# Patient Record
Sex: MALE | Race: WHITE | NOT HISPANIC OR LATINO | Employment: UNEMPLOYED | URBAN - METROPOLITAN AREA
[De-identification: names, ages, dates, MRNs, and addresses within clinical notes are randomized per-mention and may not be internally consistent; named-entity substitution may affect disease eponyms.]

---

## 2022-10-26 ENCOUNTER — TELEPHONE (OUTPATIENT)
Dept: NEUROLOGY | Facility: CLINIC | Age: 47
End: 2022-10-26

## 2022-10-26 NOTE — TELEPHONE ENCOUNTER
----- Message from Raciel Carranza sent at 10/5/2022  8:13 AM CDT -----  Good morning,    Please contact the patient if [facility] epilepsy clinic is available. If not, please let me know so I can reach out to the referring office.    Thank you,    [facility]  ----- Message -----  From: Pratima Alvarez LPN  Sent: 10/4/2022   3:05 PM CDT  To: Raciel Miramontes does not see patients in clinic, only in hospital. Dr. Coleman does not have any available appointments for the rest of the year.     Thank you,  Pratima Alvarez LPN  ----- Message -----  From: Raciel Carranza  Sent: 10/4/2022   2:53 PM CDT  To: Jared Quintero    Good afternoon,    The pt listed above is being referred from Alvaro Roberto to Dr. Live Miramontes for (complex partial epileptic seizure). I have scanned the referral and records into [employer]. I was not able to find a clinic schedule for Dr. Miramontes. Please contact the patient if Dr. Coleman is able to see him. If not, please let me know and I will contact the referring office.    Thank You,    [facility]

## 2022-11-03 ENCOUNTER — TELEPHONE (OUTPATIENT)
Dept: NEUROLOGY | Facility: CLINIC | Age: 47
End: 2022-11-03
Payer: MEDICARE

## 2022-11-03 NOTE — TELEPHONE ENCOUNTER
----- Message from Joann Vann sent at 11/3/2022  2:14 PM CDT -----  Contact: [phone]  Type:  Patient Returning Call    Who Called:  Jonel Santos Mother   Who Left Message for Patient:  Francisca   Does the patient know what this is regarding?: yes     Best Call Back Number:   [phone]

## 2022-11-03 NOTE — TELEPHONE ENCOUNTER
Returned call to discuss scheduling, left message instructing to pt to call back to discuss scheduling an appt.

## 2022-11-03 NOTE — TELEPHONE ENCOUNTER
----- Message from Allison Quiros sent at 11/2/2022 10:02 AM CDT -----  Regarding: Referral on file 10/4 in media, please contact to schedule  Referral on file 10/4 in media, please contact to schedule  Patient wants to be scheduled in [place]  He was placed on wait list and advised to check often for available appts  No dates coming up for me to schedule    Please contact  to schedule at your earliest opportunity @# [phone]    PT

## 2022-11-03 NOTE — TELEPHONE ENCOUNTER
Spoke with the pts mother, reports the pt is scheduled to have an EEG at [facility] . I advised the mother that the pt will need to have the EEG performed at [facility] instead so the results will be available at the time of visit. Mother v/u and agreed to cancel EEG. I contacted the PCP to request order for EEG be sent to our office so I can schedule the pt, office was closed, I will call back tomorrow to get the EEG.

## 2022-11-08 NOTE — TELEPHONE ENCOUNTER
MANAS at Dr. Roberto's office requesting the order for the EEG be faxed to our office, fax number provided.

## 2022-11-09 NOTE — TELEPHONE ENCOUNTER
Spoke with Dr Roberto's office, reports the EEG order is being faxed now. Will schedule with [facility] once received.

## 2022-11-09 NOTE — TELEPHONE ENCOUNTER
Order for EEG faxed to [email or web address] . They plan to call the pts mother to schedule. Order sent to scanning.

## 2022-11-28 ENCOUNTER — TELEPHONE (OUTPATIENT)
Dept: NEUROLOGY | Facility: CLINIC | Age: 47
End: 2022-11-28
Payer: MEDICARE

## 2022-11-28 NOTE — TELEPHONE ENCOUNTER
Spoke to the pt, appt scheduled from wait list 12/1/22. Pt to get CT of head from [facility] and bring to the visit. Cd and report.  EEG done on 11/30.

## 2022-12-02 ENCOUNTER — OFFICE VISIT (OUTPATIENT)
Dept: NEUROLOGY | Facility: CLINIC | Age: 47
End: 2022-12-02
Payer: MEDICARE

## 2022-12-02 VITALS
HEART RATE: 74 BPM | DIASTOLIC BLOOD PRESSURE: 85 MMHG | WEIGHT: 315 LBS | SYSTOLIC BLOOD PRESSURE: 127 MMHG | RESPIRATION RATE: 18 BRPM

## 2022-12-02 DIAGNOSIS — S06.0X0A CONCUSSION WITHOUT LOSS OF CONSCIOUSNESS, INITIAL ENCOUNTER: Primary | ICD-10-CM

## 2022-12-02 PROCEDURE — 99213 OFFICE O/P EST LOW 20 MIN: CPT | Mod: PBBFAC,PO | Performed by: PSYCHIATRY & NEUROLOGY

## 2022-12-02 PROCEDURE — 99203 OFFICE O/P NEW LOW 30 MIN: CPT | Mod: S$PBB,,, | Performed by: PSYCHIATRY & NEUROLOGY

## 2022-12-02 PROCEDURE — 99999 PR PBB SHADOW E&M-EST. PATIENT-LVL III: ICD-10-PCS | Mod: PBBFAC,,, | Performed by: PSYCHIATRY & NEUROLOGY

## 2022-12-02 PROCEDURE — 99999 PR PBB SHADOW E&M-EST. PATIENT-LVL III: CPT | Mod: PBBFAC,,, | Performed by: PSYCHIATRY & NEUROLOGY

## 2022-12-02 PROCEDURE — 99203 PR OFFICE/OUTPT VISIT, NEW, LEVL III, 30-44 MIN: ICD-10-PCS | Mod: S$PBB,,, | Performed by: PSYCHIATRY & NEUROLOGY

## 2022-12-02 RX ORDER — OLMESARTAN MEDOXOMIL 40 MG/1
40 TABLET ORAL
COMMUNITY
Start: 2022-10-04

## 2022-12-02 RX ORDER — ALBUTEROL SULFATE 90 UG/1
AEROSOL, METERED RESPIRATORY (INHALATION)
COMMUNITY
Start: 2022-08-08

## 2022-12-02 RX ORDER — ATORVASTATIN CALCIUM 40 MG/1
40 TABLET, FILM COATED ORAL
COMMUNITY
Start: 2022-10-04

## 2022-12-02 RX ORDER — AMLODIPINE BESYLATE 5 MG/1
5 TABLET ORAL
COMMUNITY
Start: 2022-10-04

## 2022-12-02 RX ORDER — AMOXICILLIN 500 MG
CAPSULE ORAL DAILY
COMMUNITY

## 2022-12-02 RX ORDER — NIACIN 500 MG/1
TABLET, EXTENDED RELEASE ORAL
COMMUNITY

## 2022-12-02 RX ORDER — TRAZODONE HYDROCHLORIDE 100 MG/1
100 TABLET ORAL NIGHTLY
COMMUNITY
Start: 2022-10-04

## 2022-12-02 RX ORDER — DULOXETIN HYDROCHLORIDE 60 MG/1
60 CAPSULE, DELAYED RELEASE ORAL
COMMUNITY
Start: 2022-10-04

## 2022-12-02 RX ORDER — TRIAMTERENE/HYDROCHLOROTHIAZID 37.5-25 MG
TABLET ORAL
COMMUNITY

## 2022-12-02 RX ORDER — ICOSAPENT ETHYL 1000 MG/1
2 CAPSULE ORAL 2 TIMES DAILY
COMMUNITY
Start: 2022-10-04

## 2022-12-02 NOTE — PROGRESS NOTES
Date of service:  12/02/2022     Chief complaint:  Question of seizure    History of present illness:  The patient is a 47 y.o. male with developmental delay we have been asked to see for evaluation of an episode for which concern was voiced for seizure. This occurred about 2 months ago. There was no clear warning.  The patient fell and may have struck his head.  He seemed a little confused, and he may have had some diffuse twitching.  He did not lose consciousness.  Afterwards, he remained confused for about 5 days.  He is now back to his baseline.  He has no prior history of seizures.      Past Medical History:   Diagnosis Date    Headache     Mental disorder     Mild intermittent asthma, uncomplicated     Obesity        Past Surgical History:   Procedure Laterality Date    CHOLECYSTECTOMY      TONSILLECTOMY, ADENOIDECTOMY         Family History   Problem Relation Age of Onset    Asthma Mother     Heart disease Maternal Grandmother     Seizures Maternal Grandfather     Cancer Maternal Grandfather        Social History     Socioeconomic History    Marital status: Single   Tobacco Use    Smoking status: Every Day     Types: Cigars    Smokeless tobacco: Never   Substance and Sexual Activity    Alcohol use: Yes    Drug use: Never    Sexual activity: Not Currently        Review of patient's allergies indicates:   Allergen Reactions    Demerol [meperidine]     Dilaudid [hydromorphone]         Review of Systems  The patient's ROS is noncontributory except as noted above.    Physical exam:  /85 (BP Location: Left arm, Patient Position: Sitting, BP Method: Large (Automatic))   Pulse 74   Resp 18   Wt (!) 144.8 kg (319 lb 5.4 oz)   General: Well developed, obese.  No acute distress.  ENT: Mucus membranes moist.  Atraumatic external nose and ears.  Lymphatic: No apparent lymphadenopathy.  Cardiovascular: Regular rate and rhythm.  Pulmonary: No increased work of breathing.  Abdomen/GI: No guarding.  Musculoskeletal:  No clubbing or cyanosis.    Neurological exam:  Mental status: Awake and alert.  Speech sparse.  Recent and remote memory appear to be limited.  Fund of knowledge limited.  Cranial nerves: Pupils equal round and reactive to light, extraocular movements intact, facial strength and sensation intact bilaterally, palate and tongue midline, hearing grossly intact bilaterally.  Motor: 5 out of 5 strength throughout the upper and lower extremities bilaterally. Normal bulk and tone.  Sensation: Intact to light touch and temperature bilaterally.  DTR: 2+ at the knees and biceps bilaterally.  Coordination: Finger-nose-finger testing intact bilaterally.  Gait: Nonfocal gait.    Data base:  Notes from the referring physician were reviewed.  Briefly summarized, these indicate question of seizure.    EEG:  Normal    Assessment and plan:  The patient is a 47 y.o. male we have been asked to see for evaluation for event questionable for seizure. In listening to the history, my index of suspicion that the event is a seizure is low.  Further, the patient's EEG was not worrisome. I think that the patient may have suffered a mild concussion from his fall which produced his confusion.  We will see the patient back on a prn basis.

## 2024-03-23 LAB — POCT GLUCOSE: 149 MG/DL (ref 70–110)

## 2024-03-23 PROCEDURE — 99285 EMERGENCY DEPT VISIT HI MDM: CPT

## 2024-03-23 PROCEDURE — 82962 GLUCOSE BLOOD TEST: CPT

## 2024-03-24 ENCOUNTER — HOSPITAL ENCOUNTER (EMERGENCY)
Facility: HOSPITAL | Age: 49
Discharge: HOME | End: 2024-03-24
Attending: EMERGENCY MEDICINE
Payer: MEDICARE

## 2024-03-24 VITALS
DIASTOLIC BLOOD PRESSURE: 61 MMHG | HEART RATE: 80 BPM | WEIGHT: 315 LBS | HEIGHT: 70 IN | RESPIRATION RATE: 17 BRPM | TEMPERATURE: 97 F | BODY MASS INDEX: 45.1 KG/M2 | OXYGEN SATURATION: 95 % | SYSTOLIC BLOOD PRESSURE: 141 MMHG

## 2024-03-24 DIAGNOSIS — R10.9 ABDOMINAL PAIN, UNSPECIFIED ABDOMINAL LOCATION: Primary | ICD-10-CM

## 2024-03-24 DIAGNOSIS — R93.5 ABNORMAL CT OF THE ABDOMEN: ICD-10-CM

## 2024-03-24 LAB
ALBUMIN SERPL BCP-MCNC: 4.1 G/DL (ref 3.5–5.2)
ALP SERPL-CCNC: 78 U/L (ref 55–135)
ALT SERPL W/O P-5'-P-CCNC: 81 U/L (ref 10–44)
ANION GAP SERPL CALC-SCNC: 14 MMOL/L (ref 8–16)
AST SERPL-CCNC: 39 U/L (ref 10–40)
BACTERIA #/AREA URNS HPF: NORMAL /HPF
BASOPHILS # BLD AUTO: 0.04 K/UL (ref 0–0.2)
BASOPHILS NFR BLD: 0.4 % (ref 0–1.9)
BILIRUB SERPL-MCNC: 0.8 MG/DL (ref 0.1–1)
BILIRUB UR QL STRIP: NEGATIVE
BUN SERPL-MCNC: 17 MG/DL (ref 6–20)
CALCIUM SERPL-MCNC: 10.3 MG/DL (ref 8.7–10.5)
CHLORIDE SERPL-SCNC: 98 MMOL/L (ref 95–110)
CLARITY UR: CLEAR
CO2 SERPL-SCNC: 20 MMOL/L (ref 23–29)
COLOR UR: YELLOW
CREAT SERPL-MCNC: 1.1 MG/DL (ref 0.5–1.4)
DIFFERENTIAL METHOD BLD: ABNORMAL
EOSINOPHIL # BLD AUTO: 0.1 K/UL (ref 0–0.5)
EOSINOPHIL NFR BLD: 0.8 % (ref 0–8)
ERYTHROCYTE [DISTWIDTH] IN BLOOD BY AUTOMATED COUNT: 13.8 % (ref 11.5–14.5)
EST. GFR  (NO RACE VARIABLE): >60 ML/MIN/1.73 M^2
GLUCOSE SERPL-MCNC: 139 MG/DL (ref 70–110)
GLUCOSE UR QL STRIP: NEGATIVE
HCT VFR BLD AUTO: 43.4 % (ref 40–54)
HGB BLD-MCNC: 14.2 G/DL (ref 14–18)
HGB UR QL STRIP: NEGATIVE
HYALINE CASTS #/AREA URNS LPF: 0 /LPF
IMM GRANULOCYTES # BLD AUTO: 0.02 K/UL (ref 0–0.04)
IMM GRANULOCYTES NFR BLD AUTO: 0.2 % (ref 0–0.5)
KETONES UR QL STRIP: NEGATIVE
LEUKOCYTE ESTERASE UR QL STRIP: NEGATIVE
LIPASE SERPL-CCNC: 9 U/L (ref 4–60)
LYMPHOCYTES # BLD AUTO: 1.6 K/UL (ref 1–4.8)
LYMPHOCYTES NFR BLD: 15.8 % (ref 18–48)
MAGNESIUM SERPL-MCNC: 2.2 MG/DL (ref 1.6–2.6)
MCH RBC QN AUTO: 26.4 PG (ref 27–31)
MCHC RBC AUTO-ENTMCNC: 32.7 G/DL (ref 32–36)
MCV RBC AUTO: 81 FL (ref 82–98)
MICROSCOPIC COMMENT: NORMAL
MONOCYTES # BLD AUTO: 0.5 K/UL (ref 0.3–1)
MONOCYTES NFR BLD: 4.8 % (ref 4–15)
NEUTROPHILS # BLD AUTO: 7.9 K/UL (ref 1.8–7.7)
NEUTROPHILS NFR BLD: 78 % (ref 38–73)
NITRITE UR QL STRIP: NEGATIVE
NRBC BLD-RTO: 0 /100 WBC
PH UR STRIP: 6 [PH] (ref 5–8)
PLATELET # BLD AUTO: 276 K/UL (ref 150–450)
PMV BLD AUTO: 11.3 FL (ref 9.2–12.9)
POTASSIUM SERPL-SCNC: 4.1 MMOL/L (ref 3.5–5.1)
PROT SERPL-MCNC: 8.9 G/DL (ref 6–8.4)
PROT UR QL STRIP: ABNORMAL
RBC # BLD AUTO: 5.38 M/UL (ref 4.6–6.2)
RBC #/AREA URNS HPF: 0 /HPF (ref 0–4)
SODIUM SERPL-SCNC: 132 MMOL/L (ref 136–145)
SP GR UR STRIP: 1.03 (ref 1–1.03)
SQUAMOUS #/AREA URNS HPF: 0 /HPF
URN SPEC COLLECT METH UR: ABNORMAL
UROBILINOGEN UR STRIP-ACNC: ABNORMAL EU/DL
WBC # BLD AUTO: 10.18 K/UL (ref 3.9–12.7)
WBC #/AREA URNS HPF: 0 /HPF (ref 0–5)

## 2024-03-24 PROCEDURE — 83690 ASSAY OF LIPASE: CPT | Performed by: EMERGENCY MEDICINE

## 2024-03-24 PROCEDURE — 85025 COMPLETE CBC W/AUTO DIFF WBC: CPT | Performed by: EMERGENCY MEDICINE

## 2024-03-24 PROCEDURE — 25000003 PHARM REV CODE 250: Performed by: EMERGENCY MEDICINE

## 2024-03-24 PROCEDURE — 80053 COMPREHEN METABOLIC PANEL: CPT | Performed by: EMERGENCY MEDICINE

## 2024-03-24 PROCEDURE — 81000 URINALYSIS NONAUTO W/SCOPE: CPT | Performed by: EMERGENCY MEDICINE

## 2024-03-24 PROCEDURE — 25500020 PHARM REV CODE 255: Performed by: EMERGENCY MEDICINE

## 2024-03-24 PROCEDURE — 83735 ASSAY OF MAGNESIUM: CPT | Performed by: EMERGENCY MEDICINE

## 2024-03-24 RX ORDER — ACETAMINOPHEN 325 MG/1
650 TABLET ORAL
Status: COMPLETED | OUTPATIENT
Start: 2024-03-24 | End: 2024-03-24

## 2024-03-24 RX ADMIN — ACETAMINOPHEN 650 MG: 325 TABLET ORAL at 01:03

## 2024-03-24 RX ADMIN — IOHEXOL 100 ML: 350 INJECTION, SOLUTION INTRAVENOUS at 02:03

## 2024-03-24 NOTE — ED PROVIDER NOTES
Encounter Date: 3/23/2024    SCRIBE #1 NOTE: I, CHIVO Mueller, am scribing for, and in the presence of,  Mya Reis MD. I have scribed the following portions of the note - Other sections scribed: HPI, ROS, PE, MDM.       History     Chief Complaint   Patient presents with    Abdominal Pain     LLQ x1 hour. Pt with intellectual disability, so unable to give much detail. Mother at side who denies N/V/D. No tx used.      Luis Manuel Stallings is a 48 y.o. male, with a PMHx of intellectual disability, DM, HTN, and HLD, who presents to the ED with left mid abdominal pain which started ~1 hr PTA. Patient's mother reports pt went to a BBQ earlier today. Pt ate a little less than usual and had 2 beers. Pt was unable to tell if there was any change in BM recently. Last BM was believed to be 1 day ago, but pt was not sure. Pain is worse when bending/moving. No treatment given PTA other than OTC allergy medication earlier today. No other exacerbating or alleviating factors. Denies nausea, vomiting, diarrhea, fever, or other associated symptoms. Daily medications include metformin, albuterol, Cymbalta, niacin, olmesartan, amlodipine, Lipitor, and trazodone. Patient denies illicit drug use. He smokes 1 cigar per day. He drinks alcohol occasionally. Patient reports a PSHx of cholecystectomy, tonsillectomy, and adenoidectomy. Drug allergies include demerol, dilaudid and msot pain medications. Mother states pt is able to take Tylenol.    The history is provided by the patient and a parent (Mother present). The history is limited by a developmental delay. No  was used.     Review of patient's allergies indicates:   Allergen Reactions    Demerol [meperidine]     Dilaudid [hydromorphone]      Past Medical History:   Diagnosis Date    Headache     Mental disorder     Mild intermittent asthma, uncomplicated     Obesity      Past Surgical History:   Procedure Laterality Date    CHOLECYSTECTOMY      TONSILLECTOMY, ADENOIDECTOMY        Family History   Problem Relation Age of Onset    Asthma Mother     Heart disease Maternal Grandmother     Seizures Maternal Grandfather     Cancer Maternal Grandfather      Social History     Tobacco Use    Smoking status: Every Day     Types: Cigars    Smokeless tobacco: Never   Substance Use Topics    Alcohol use: Yes    Drug use: Never     Review of Systems   Constitutional:  Negative for chills and fever.   HENT:  Negative for drooling and voice change.    Eyes:  Negative for photophobia and visual disturbance.   Respiratory:  Negative for shortness of breath and wheezing.    Cardiovascular:  Negative for chest pain and leg swelling.   Gastrointestinal:  Positive for abdominal pain. Negative for diarrhea, nausea and vomiting.   Genitourinary:  Negative for dysuria, frequency, hematuria and urgency.   Musculoskeletal:  Negative for myalgias and neck pain.   Skin:  Negative for rash and wound.   Neurological:  Negative for syncope, weakness and numbness.   Psychiatric/Behavioral:  Negative for agitation, confusion and suicidal ideas.    All other systems reviewed and are negative.      Physical Exam     Initial Vitals [03/23/24 2248]   BP Pulse Resp Temp SpO2   (!) 160/95 91 17 98.6 °F (37 °C) 98 %      MAP       --         Physical Exam    Nursing note and vitals reviewed.  Constitutional: He appears well-developed and well-nourished. He is not diaphoretic. No distress.   Exam consistent with intellectual disability at baseline per family.    HENT:   Head: Normocephalic and atraumatic.   Right Ear: External ear normal.   Left Ear: External ear normal.   Mouth/Throat: Oropharynx is clear and moist. No oropharyngeal exudate.   Eyes: Conjunctivae and EOM are normal. Pupils are equal, round, and reactive to light. Right eye exhibits no discharge. Left eye exhibits no discharge.   Neck: Neck supple. No JVD present.   Normal range of motion.  Cardiovascular:  Normal rate, regular rhythm, normal heart sounds and  intact distal pulses.     Exam reveals no gallop and no friction rub.       No murmur heard.  Pulmonary/Chest: Breath sounds normal. No respiratory distress. He has no wheezes. He has no rhonchi. He has no rales.   Abdominal: Abdomen is soft. Bowel sounds are normal. He exhibits no distension. There is no abdominal tenderness.   Large scare to RUQ.  Pain in mid left abdominal area, but pain not reproducible upon palpation.  Negative Orlando's sign, no CVA tenderness   No right CVA tenderness.  No left CVA tenderness. There is no rebound and no guarding.   Genitourinary:    Genitourinary Comments: RN chaperone present. Uncircumcised. No rash, no testicular tenderness. No large hernias noted. No discharge.      Musculoskeletal:         General: No tenderness or edema. Normal range of motion.      Cervical back: Normal range of motion and neck supple.     Lymphadenopathy:     He has no cervical adenopathy.   Neurological: He is alert and oriented to person, place, and time. He has normal strength. No cranial nerve deficit. GCS score is 15. GCS eye subscore is 4. GCS verbal subscore is 5. GCS motor subscore is 6.   Skin: Skin is warm and dry. Capillary refill takes less than 2 seconds.   Psychiatric: He has a normal mood and affect. Thought content normal.         ED Course   Procedures  Labs Reviewed   CBC W/ AUTO DIFFERENTIAL - Abnormal; Notable for the following components:       Result Value    MCV 81 (*)     MCH 26.4 (*)     Gran # (ANC) 7.9 (*)     Gran % 78.0 (*)     Lymph % 15.8 (*)     All other components within normal limits   COMPREHENSIVE METABOLIC PANEL - Abnormal; Notable for the following components:    Sodium 132 (*)     CO2 20 (*)     Glucose 139 (*)     Total Protein 8.9 (*)     ALT 81 (*)     All other components within normal limits   URINALYSIS, REFLEX TO URINE CULTURE - Abnormal; Notable for the following components:    Protein, UA 1+ (*)     Urobilinogen, UA 2.0-3.0 (*)     All other components  within normal limits    Narrative:     Specimen Source->Urine   POCT GLUCOSE - Abnormal; Notable for the following components:    POCT Glucose 149 (*)     All other components within normal limits   LIPASE   MAGNESIUM   URINALYSIS MICROSCOPIC    Narrative:     Specimen Source->Urine          Imaging Results              CT Abdomen Pelvis With IV Contrast NO Oral Contrast (Final result)  Result time 03/24/24 02:40:53      Final result by Emigdio Sharif MD (03/24/24 02:40:53)                   Impression:      There is no evidence for acute inflammatory or obstructive process of the abdomen or pelvis.    Additional findings as above.      Electronically signed by: Emigdio Sharif  Date:    03/24/2024  Time:    02:40               Narrative:    EXAMINATION:  CT ABDOMEN PELVIS WITH IV CONTRAST    CLINICAL HISTORY:  LLQ abdominal pain;history of Intellectual disability;    TECHNIQUE:  Low dose axial images, sagittal and coronal reformations were obtained from the lung bases to the pubic symphysis following the IV administration of 100 mL of Omnipaque 350 oral contrast was not utilized.  Single phase postcontrast CT examination of the abdomen and pelvis is submitted.  There is mild artifact present diminishing sensitivity of the exam.    COMPARISON:  None.    FINDINGS:  The visualized lung bases demonstrate appearance of mild motion artifact and diminished depth of inspiration.  The stomach demonstrates nonspecific appearance of mild fluid and air within the gastric lumen.  The gallbladder is surgically absent.  There is no evidence for peripancreatic inflammatory change, there is no abnormal pancreatic or biliary ductal dilatation.  Mild diminished attenuation of the liver may relate to mild diffuse fatty infiltrate.  There is no evidence for acute process of the liver, spleen or adrenal glands.    Exophytic hypodensity of the right kidney measures 16.7 Hounsfield units, likely a small cyst.  There is no evidence for  ureteral calculus or obstructive uropathy bilaterally.  The abdominal aorta appears normal in caliber, demonstrates appropriate opacification.  Small retroperitoneal lymph nodes are noted, not enlarged by size criteria.  The urinary bladder appears unremarkable for degree of distention.    There is mild prominence of the prostate, mild calcifications/mineralization.  Tiny inguinal hernias without bowel involvement.    There is no evidence for small bowel obstructive process.  The appendix is not identified.  There is no evidence for inflammatory or obstructive process of the colon.  There is no evidence for free intraperitoneal air.    There are bilateral pars defects at L5.  There is minimal grade 1 retrolisthesis of L4 with respect L5 and minimal grade 1 anterolisthesis of L5 with respect S1.  There is loss of disc space height at L5-S1.  Chronic changes of the osseous structures are noted.                                       Medications   acetaminophen tablet 650 mg (650 mg Oral Given 3/24/24 0111)   iohexoL (OMNIPAQUE 350) injection 100 mL (100 mLs Intravenous Given 3/24/24 0204)     Medical Decision Making  Amount and/or Complexity of Data Reviewed  Radiology: ordered.    Risk  OTC drugs.  Prescription drug management.    MDM  48-year-old male with past medical history of intellectual disability, hypertension, hyperlipidemia, diabetes, asthma presents with left mid abdominal pain for the last 1 hour.  History is limited by patient's intellectual disability.  No nausea vomiting or fever per caretaker.  Last bowel movement was likely yesterday although unsure of exact timing.  Patient has reported chronic bright red blood in stool from hemorrhoids which is being followed by primary care doctor.  Caretaker is unsure if he has had this recently.  Patient denies any problems with urination.  He was at a barbecue today and did have 2 beers, did not overeat per caregiver.  On exam patient with large scar to his  right upper quadrant.  No CVA tenderness.  He is pointing to his mid left abdomen and saying that his pain is there he has not able to describe the pain to me.  He does not appear tender on exam and denies increased pain with palpation.  Slightly unkempt.  Moist mucous membranes.  Trace pitting edema to ankles.  Differential diagnosis includes was not limited to gastritis, pancreatitis, small-bowel obstruction, enteritis, acute intra-abdominal abnormality, metabolic derangement, urinary tract infection, nephrolithiasis.  Given patient's difficult with communicating his symptoms we will obtain labs, urine, CT abdomen and pelvis.  Patient caretaker states he is allergic to pain medications but can take Tylenol and ibuprofen if needed.  We will give Tylenol.    Patient pain improved during ED stay. UA without infection. 1+ protein.     No leukocytosis or anemia    Mild low Na at 132. CO2 20. ALT 81.     Lipase WNL    CT abd with no acute findings. Multiple chronic findings (inguinal hernia, renal cyst, fatty liver, prominent prostate, OA of spine). I discussed these findings with mother and need for close follow up for further evaluation.     Patient denying pain currently in ED. Advised APAP/ibuprofen PRN, and strict return precautions for continued pain, vomiting, fever, or any other concerns.     Family and patient in agreement with plan and all questions answered.           Scribe Attestation:   Scribe #1: I performed the above scribed service and the documentation accurately describes the services I performed. I attest to the accuracy of the note.                             I, Mya Reis, personally performed the services described in this documentation.  All medical record entries made by the scribe were at my direction and in my presence.  I have reviewed the chart and agree that the record reflects my personal performance and is accurate and complete.    Clinical Impression:  Final diagnoses:  [R10.9]  Abdominal pain, unspecified abdominal location - Left mid abdomen  (Primary)  [R93.5] Abnormal CT of the abdomen - kidney cyst, small inguinal herna, arthritis of spine, prominence of prostate          ED Disposition Condition    Discharge Stable          ED Prescriptions    None       Follow-up Information       Follow up With Specialties Details Why Contact Info    Alvaro Roberto MD Internal Medicine Schedule an appointment as soon as possible for a visit in 2 days to discuss recent ED visit, to establish primary doctor [address] [place] [id number]  [phone]      [facility] - Emergency Dept Emergency Medicine  As needed, If symptoms worsen [address] [facility] [place] [id number] [phone]             Mya Reis MD  03/24/24 2403

## 2024-03-24 NOTE — DISCHARGE INSTRUCTIONS
Please take acetaminophen or ibuprofen as needed for pain. Return for fever, worsening pain, vomiting, or any other concerns. Please follow up with primary care doctor for multiple chronic abnormalities noted on CT scan that we discussed as he will need monitoring and further testing for these.     Thank you for coming to our Emergency Department today. As we discussed, it is important to remember that some problems are difficult to diagnose and may not be found during your Emergency Department visit. Be sure to follow up with your primary care doctor and review all labs/imaging/tests that were performed during this visit with them. Some labs/tests may be outside of the normal range and require non-emergent follow-up and further investigation to help diagnose/exclude/prevent complications or other medical conditions.    If you do not have a primary care doctor, you may contact the one listed on your discharge paperwork or you may also call the [facility] Appointment Desk at [phone] to schedule an appointment and establish care with one. It is important to your health that you have a primary care doctor.    Please take all medications as directed. All medications may potentially have side-effects and it is impossible to predict which medications may give you side-effects or what side-effects (if any) they will give you.. If you feel that you are having a negative effect or side-effect of any medication you should immediately stop taking them and seek medical attention. If you feel that you are having a life-threatening reaction call 911.    Return to the ER with any questions/concerns, new/concerning symptoms, worsening or failure to improve.     Do not drive, swim, climb to height, take a bath or make any important decisions for 24 hours if you have received any pain medications, sedatives or mood altering drugs during your ER visit.

## 2024-03-24 NOTE — ED TRIAGE NOTES
Patient with intellectual disability presents to ED for the evaluation of abdominal pain that started few hours ago patients arrival. As per mother pt has been having less intake. Pt started crying of abdominal  pain today . Mother reports opt has chronic hemorrhoids and comes for lab work every 3 months for DM, HTN, HLD